# Patient Record
Sex: FEMALE | Race: WHITE
[De-identification: names, ages, dates, MRNs, and addresses within clinical notes are randomized per-mention and may not be internally consistent; named-entity substitution may affect disease eponyms.]

---
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
## 2021-12-29 ENCOUNTER — HOSPITAL ENCOUNTER (EMERGENCY)
Dept: HOSPITAL 43 - DL.ED | Age: 21
Discharge: HOME | End: 2021-12-29
Payer: MEDICAID

## 2021-12-29 DIAGNOSIS — Z88.1: ICD-10-CM

## 2021-12-29 DIAGNOSIS — J10.1: Primary | ICD-10-CM

## 2021-12-29 DIAGNOSIS — Z20.822: ICD-10-CM

## 2021-12-29 LAB — SARS-COV-2 RNA RESP QL NAA+PROBE: NEGATIVE

## 2021-12-29 PROCEDURE — 99283 EMERGENCY DEPT VISIT LOW MDM: CPT

## 2021-12-29 PROCEDURE — 0240U: CPT

## 2021-12-29 NOTE — EDM.PDOC
ED HPI GENERAL MEDICAL PROBLEM





- General


Chief Complaint: ENT Problem


Stated Complaint: POSSIBLE COVID


Time Seen by Provider: 12/29/21 11:50


Source of Information: Reports: Patient


History Limitations: Reports: No Limitations





- History of Present Illness


INITIAL COMMENTS - FREE TEXT/NARRATIVE: 





This 22 yo female patient reports to the ED with a 2 day history of chills, body

aches and head congestion. The patient reports her symptoms have been getting 

worse over the past 24 hours. 


Onset Date: 12/28/21


Duration: Constant, Getting Worse


Location: Reports: Generalized


Quality: Reports: Other


Severity: Moderate


Improves with: Reports: Medication


Worsens with: Reports: None


Context: Reports: Other


Associated Symptoms: Reports: Fever/Chills, Loss of Appetite


  ** Headache


Pain Score (Numeric/FACES): 6





- Related Data


                                    Allergies











Allergy/AdvReac Type Severity Reaction Status Date / Time


 


minocycline Allergy  Rash Verified 12/29/21 11:53











Home Meds: 


                                    Home Meds





Cranberry Fruit Extract/Vit C [Azo Cranberry Softgel] 1 tab PO DAILY 11/10/20 

[History]


Multivitamin [Daily Multiple Vitamin] 1 each PO DAILY 11/10/20 [History]


buPROPion [buPROPion XL] 150 mg PO DAILY 11/10/20 [History]


Benzoyl Peroxide/Clindamycin [BenzaClin Gel] 1 applic TOP .MORNING 11/11/20 

[History]


Ibuprofen [Advil] 200 mg PO Q6HR PRN 11/11/20 [History]


Tretinoin [Retin-A] 1 applic TOP BEDTIME 11/11/20 [History]


levonorgestreL [Kyleena] 1 each IU ASDIRECTED 11/11/20 [History]











Past Medical History





- Past Surgical History


HEENT Surgical History: Reports: Tonsillectomy


Other HEENT Surgeries/Procedures: wisdom tooth removed





Social & Family History





- Family History


Family Medical History: No Pertinent Family History





- Tobacco Use


Tobacco Use Status *Q: Never Tobacco User


Second Hand Smoke Exposure: No





- Caffeine Use


Caffeine Use: Reports: Coffee, Soda





- Recreational Drug Use


Recreational Drug Use: No





ED ROS ENT





- Review of Systems


Review Of Systems: Comprehensive ROS is negative, except as noted in HPI.





ED EXAM, ENT





- Physical Exam


Exam: See Below


Exam Limited By: No Limitations


General Appearance: Alert, WD/WN, Mild Distress


Eye Exam: Bilateral Eye: EOMI, Normal Inspection, PERRL


Ears: Normal External Exam, Normal Canal, Hearing Grossly Normal, Normal TMs


Nose: Normal Inspection, Normal Mucousa, No Blood


Mouth/Throat: Normal Inspection, Normal Gums, Normal Lips, Normal Oropharynx, 

Normal Teeth


Head: Atraumatic, Normocephalic


Neck: Normal Inspection, Supple, Non-Tender, Full Range of Motion


Respiratory/Chest: No Respiratory Distress, Lungs Clear, Normal Breath Sounds, 

No Accessory Muscle Use, Chest Non-Tender


Cardiovascular: Normal Peripheral Pulses, Regular Rate, Rhythm, No Edema, No 

Gallop, No JVD, No Murmur, No Rub


GI/Abdominal: Normal Bowel Sounds, Soft, Non-Tender, No Organomegaly, No 

Distention, No Abnormal Bruit, No Mass


 (Female) Exam: Deferred


Rectal (Female) Exam: Deferred


Back: Normal Inspection, Full Range of Motion


Extremities: Normal Inspection, Normal Range of Motion, Non-Tender, No Pedal 

Edema, Normal Capillary Refill


Neurological: Alert, Oriented, CN II-XII Intact, Normal Cognition, Normal Gait, 

Normal Reflexes, No Motor/Sensory Deficits


Psychiatric: Normal Affect, Normal Mood


Skin: Warm, Dry, Intact, Normal Color, No Rash


Lymphatic: No Adenopathy





Course





- Vital Signs


Last Recorded V/S: 


                                Last Vital Signs











Temp  101.5 F H  12/29/21 11:49


 


Pulse  118 H  12/29/21 11:49


 


Resp  18   12/29/21 11:49


 


BP  113/59 L  12/29/21 11:49


 


Pulse Ox  99   12/29/21 11:49














- Orders/Labs/Meds


Labs: 


                                Laboratory Tests











  12/29/21 Range/Units





  10:30 


 


Influenza Type A RNA  Positive H  (NEGATIVE)  


 


Influenza Type B RNA  Negative  (NEGATIVE)  


 


SARS-CoV-2 RNA (WANDA)  Negative  (NEGATIVE)  














Departure





- Departure


Time of Disposition: 12:00


Disposition: Home, Self-Care 01


Condition: Fair


Clinical Impression: 


 Influenza A








- Discharge Information


*PRESCRIPTION DRUG MONITORING PROGRAM REVIEWED*: Not Applicable


*COPY OF PRESCRIPTION DRUG MONITORING REPORT IN PATIENT ARJUN: Not Applicable


Instructions:  Influenza, Adult, Easy-to-Read


Forms:  ED Department Discharge


Care Plan Goals: 


The patient was advised of the examination and lab results during the visit. The

 patient was discharged with a script for Tamiflu (75 mg) #10 to take 1 by mouth

 2 times per day for 5 days. The patient was encouraged to increase her oral 

fluid intake. The patient may take Tylenol or ibuprofen as directed for 

temporary symptom relief. The patient was encouraged to stick to a BRAT diet 

(bananas, rice, applesauce and toast) with small frequent sips of fluid. If the 

patient has any additional symptoms or concerns, the patient should follow-up 

with her primary care facility or return to the emergency department.





Sepsis Event Note (ED)





- Evaluation


Sepsis Screening Result: No Definite Risk





- Focused Exam


Vital Signs: 


                                   Vital Signs











  Temp Pulse Resp BP Pulse Ox


 


 12/29/21 11:49  101.5 F H  118 H  18  113/59 L  99

## 2023-09-19 NOTE — OR
DATE:  11/12/2020

 

PREOPERATIVE DIAGNOSIS:  Bowel function change, alternating diarrhea.

 

POSTOPERATIVE DIAGNOSIS:  Bowel function change, alternating diarrhea.

 

PROCEDURE:  Total colonoscopy.

 

ANESTHESIA:  Conscious sedation with IV Versed and fentanyl.

 

SPECIMEN:  None.

 

OPERATIVE FINDINGS:  Normal colonoscopy.

 

RECOMMENDATIONS:  Followup as needed.

 

INDICATION FOR PROCEDURE:  This 20-year-old female referred by Dr. Syed for

evaluation of bowel function change.  She has significant diarrhea.  No

particular family history of colon problems or rectal bleeding.

 

PROCEDURE IN DETAIL:  After adequate preparation, a colonoscope was inserted

into the rectum.  This was easily passed all the way to the cecum.  Confirmation

of the cecum was made by visualization of the ileocecal valve and the

appendiceal opening.  A photograph of this was taken.  The bowel prep was

excellent.  On withdrawal of the scope, the colon exam is totally normal.  There

is no evidence of any inflammation, diverticula, masses, polyps, colitis, or

other abnormalities.  Anal and rectal examinations are also normal.  Air was

suctioned from the colon and the scope removed.

 

DD:  11/12/2020 07:08:34

DT:  11/12/2020 07:27:58

[facility]

Job #:  [id number]
No